# Patient Record
Sex: FEMALE | Race: WHITE | Employment: PART TIME | URBAN - METROPOLITAN AREA
[De-identification: names, ages, dates, MRNs, and addresses within clinical notes are randomized per-mention and may not be internally consistent; named-entity substitution may affect disease eponyms.]

---

## 2019-04-03 ENCOUNTER — APPOINTMENT (OUTPATIENT)
Dept: GENERAL RADIOLOGY | Age: 29
End: 2019-04-03
Payer: COMMERCIAL

## 2019-04-03 ENCOUNTER — HOSPITAL ENCOUNTER (EMERGENCY)
Age: 29
Discharge: HOME | End: 2019-04-03
Attending: EMERGENCY MEDICINE
Payer: COMMERCIAL

## 2019-04-03 VITALS
HEART RATE: 78 BPM | DIASTOLIC BLOOD PRESSURE: 83 MMHG | SYSTOLIC BLOOD PRESSURE: 148 MMHG | OXYGEN SATURATION: 100 % | BODY MASS INDEX: 39.49 KG/M2 | WEIGHT: 236.99 LBS | TEMPERATURE: 98 F | HEIGHT: 65 IN | RESPIRATION RATE: 18 BRPM

## 2019-04-03 DIAGNOSIS — S93.402A SPRAIN OF LEFT ANKLE, UNSPECIFIED LIGAMENT, INITIAL ENCOUNTER: Primary | ICD-10-CM

## 2019-04-03 PROCEDURE — 99283 EMERGENCY DEPT VISIT LOW MDM: CPT

## 2019-04-03 ASSESSMENT — PAIN DESCRIPTION - LOCATION
LOCATION: ANKLE
LOCATION: ANKLE

## 2019-04-03 ASSESSMENT — PAIN DESCRIPTION - PAIN TYPE
TYPE: ACUTE PAIN
TYPE: ACUTE PAIN

## 2019-04-03 ASSESSMENT — PAIN SCALES - GENERAL
PAINLEVEL_OUTOF10: 5
PAINLEVEL_OUTOF10: 6

## 2019-04-03 ASSESSMENT — PAIN DESCRIPTION - ORIENTATION
ORIENTATION: LEFT
ORIENTATION: LEFT

## 2019-04-03 ASSESSMENT — PAIN DESCRIPTION - DESCRIPTORS: DESCRIPTORS: ACHING

## 2019-04-03 NOTE — ED NOTES
Ambulates to room #2 accompanied by S.O. States that approx 2 weeks ago when in [place], stepped out of the car and got ankle caught between the car door and car-  States that she has slight bruising at that time. Upon coming home and walking on it and using the treadmill noticed increased  Swelling and it became painful to bear weight. Difficult to determine swelling due to obesity, no deformity, no bruising.   Pedal pulses of good volume     Lauren Huggins RN  04/03/19 7070

## 2019-04-03 NOTE — ED NOTES
4 inch ace wrap applied and reviewed instructions to wrap and rewrap several times a day, to elevate  And wear a support while on the treadmill     Ty Severs, RN  04/03/19 9239

## 2019-04-03 NOTE — ED PROVIDER NOTES
[address] [facility]  eMERGENCY dEPARTMENTeNCOUnter      Pt Name: Steven Dhaliwal  MRN: [id number]  [place] 1990  Date of evaluation: 2019  Provider: Samuel Luo MD    [address] [place] [address]       Chief Complaint   Patient presents with    Joint Swelling     injuried left ankle approx 2 weeks ago (caught between door and car) slight bruise at that time, but after returning home from vacation and walking on the treadmill notiticed increased swelling and pain with weight bearing         HISTORY OF PRESENT ILLNESS   (Location/Symptom, Timing/Onset,Context/Setting, Quality, Duration, Modifying Factors, Severity)  Note limiting factors. Steven Dhaliwal is a 33 y.o. female who presents to the emergency department for left ankle swelling. About 2 weeks ago she almost fell and she was getting out of the car and her foot got stuck. She is been doing well but recently a few days ago she started walking on a treadmill and noticed swelling. She has some pain which she describes as a discomfort with weightbearing otherwise she is been doing well. No other new injuries. Nursing notes were reviewed. REVIEW OF SYSTEMS    (2-9 systems for level 4, 10 or more for level 5)     Review of Systems    Positive and pertinent negative as per HPI. Except as noted above in the ROS, all other systems were reviewed and were negative. PAST MEDICAL HISTORY     Past Medical History:   Diagnosis Date    Gallstones     Multiple abscesses of both legs     Obese          SURGICALHISTORY       Past Surgical History:   Procedure Laterality Date     SECTION  3610    no complications    CHOLECYSTECTOMY  10/21/2011    laproscopic         CURRENT MEDICATIONS       Discharge Medication List as of 4/3/2019 12:41 AM          ALLERGIES     Penicillins and Tramadol    FAMILY HISTORY     History reviewed. No pertinent family history.        SOCIAL HISTORY       Social History Socioeconomic History    Marital status: Single     Spouse name: None    Number of children: None    Years of education: None    Highest education level: None   Occupational History    None   Social Needs    Financial resource strain: None    Food insecurity:     Worry: None     Inability: None    Transportation needs:     Medical: None     Non-medical: None   Tobacco Use    Smoking status: Current Every Day Smoker     Packs/day: 0.50    Smokeless tobacco: Never Used   Substance and Sexual Activity    Alcohol use: No    Drug use: No    Sexual activity: Yes     Partners: Male   Lifestyle    Physical activity:     Days per week: None     Minutes per session: None    Stress: None   Relationships    Social connections:     Talks on phone: None     Gets together: None     Attends Ep[employer] service: None     Active member of club or organization: None     Attends meetings of clubs or organizations: None     Relationship status: None    Intimate partner violence:     Fear of current or ex partner: None     Emotionally abused: None     Physically abused: None     Forced sexual activity: None   Other Topics Concern    None   Social History Narrative    None       SCREENINGS             PHYSICAL EXAM    (up to 7 for level 4, 8 or more for level 5)     ED Triage Vitals   BP Temp Temp Source Pulse Resp SpO2 Height Weight   04/03/19 0051 04/03/19 0013 04/03/19 0013 04/03/19 0013 04/03/19 0013 04/03/19 0013 04/03/19 0013 04/03/19 0013   (!) 148/83 98 °F (36.7 °C) Oral 98 18 100 % 5' 5\" (1.651 m) 236 lb 15.9 oz (107.5 kg)       Physical Exam   Constitutional: She appears well-developed and well-nourished. HENT:   Head: Normocephalic and atraumatic. Eyes: Right eye exhibits no discharge. Left eye exhibits no discharge. Pulmonary/Chest: Effort normal. No respiratory distress. Musculoskeletal:   Full range of motion of the ankles bilaterally. Mild medial ankle edema. On the left. No crepitus. Tenderness to palpation in the anterior ankle joint line. The ankles are stable. Normal capillary refill. No ecchymosis or lacerations or abrasions. Patient able to walk 4 steps on the ankle. Neurological: She is alert. Skin: No pallor. Psychiatric: She has a normal mood and affect. Her behavior is normal.   Nursing note and vitals reviewed. DIAGNOSTIC RESULTS     EKG: All EKG's are interpreted by the Emergency Department Physician who either signs or Co-signs this chart in the absence of a cardiologist.    12 lead EKG shows     RADIOLOGY:   Non-plain film images such as CT, Ultrasound and MRI are read by the radiologist. Plain radiographic images are visualized and preliminarily interpreted by the emergency physician with the below findings:    Interpretation per the Radiologist below, if available at the time of this note:    No orders to display         ED BEDSIDE ULTRASOUND:   Performed by ED Physician - none    LABS:  Labs Reviewed - No data to display    All other labs were within normal range or not returned as of this dictation. EMERGENCY DEPARTMENT COURSE and DIFFERENTIAL DIAGNOSIS/MDM:   Vitals:    Vitals:    04/03/19 0013 04/03/19 0051   BP:  (!) 148/83   Pulse: 98 78   Resp: 18    Temp: 98 °F (36.7 °C)    TempSrc: Oral    SpO2: 100%    Weight: 236 lb 15.9 oz (107.5 kg)    Height: 5' 5\" (1.651 m)        Likely ankle sprain. History and physical exam does not support a diagnosis of fracture or dislocation. Ace wrap provided. Follow-up in the outpatient setting as recommended. CRITICAL CARE TIME   None       CONSULTS:  None    PROCEDURES:  Unless otherwise noted above, none     Procedures    FINAL IMPRESSION      1. Sprain of left ankle, unspecified ligament, initial encounter          DISPOSITION/PLAN   DISPOSITION Decision To Discharge 04/03/2019 12:34:15 AM      PATIENT REFERREDTO:  Iveth Bedoya MD  [address] [place] [address].   [address] [place] [id number]  [phone]    In 1 week        DISCHARGEMEDICATIONS:  Discharge Medication List as of 4/3/2019 12:41 AM             (Please note that portions of this note were completed with a voice recognition program.  Efforts were made to edit the dictations but occasionally words are mis-transcribed.)    Darnell Dos Santos MD (electronically signed)  Attending Emergency Physician       Darnell Dos Santos MD  04/03/19 0931

## 2020-01-03 ENCOUNTER — HOSPITAL ENCOUNTER (EMERGENCY)
Age: 30
Discharge: HOME | End: 2020-01-03
Attending: EMERGENCY MEDICINE
Payer: COMMERCIAL

## 2020-01-03 VITALS
DIASTOLIC BLOOD PRESSURE: 84 MMHG | BODY MASS INDEX: 35.44 KG/M2 | TEMPERATURE: 98.7 F | WEIGHT: 212.96 LBS | OXYGEN SATURATION: 98 % | SYSTOLIC BLOOD PRESSURE: 132 MMHG | HEART RATE: 80 BPM | RESPIRATION RATE: 11 BRPM

## 2020-01-03 PROCEDURE — 99282 EMERGENCY DEPT VISIT SF MDM: CPT

## 2020-01-03 RX ORDER — CLINDAMYCIN HYDROCHLORIDE 300 MG/1
300 CAPSULE ORAL 3 TIMES DAILY
Qty: 21 CAPSULE | Refills: 0 | Status: SHIPPED | OUTPATIENT
Start: 2020-01-03 | End: 2020-01-10

## 2020-01-03 ASSESSMENT — PAIN DESCRIPTION - ORIENTATION: ORIENTATION: RIGHT

## 2020-01-03 ASSESSMENT — PAIN DESCRIPTION - LOCATION: LOCATION: TEETH

## 2020-01-03 ASSESSMENT — PAIN SCALES - GENERAL
PAINLEVEL_OUTOF10: 10
PAINLEVEL_OUTOF10: 2

## 2020-01-03 ASSESSMENT — PAIN - FUNCTIONAL ASSESSMENT: PAIN_FUNCTIONAL_ASSESSMENT: 0-10

## 2020-01-03 ASSESSMENT — PAIN DESCRIPTION - DESCRIPTORS: DESCRIPTORS: ACHING;THROBBING

## 2020-01-04 NOTE — ED PROVIDER NOTES
CHIEF COMPLAINT  Chief Complaint   Patient presents with    Dental Pain    Facial Swelling     right facial swelling. HISTORY OF PRESENT ILLNESS  Charisma Whitlock is a 26 y.o. female who presents to the ED complaining of a 2 day h/o pain of the right lower 3rd molar with pain in the right lateral mandible. No facial swelling, trismus, fever, dysphagia or neck pain. No HA. No other complaints, modifying factors or associated symptoms. Nursing notes reviewed. Past Medical History:   Diagnosis Date    Gallstones     Multiple abscesses of both legs     Obese      Past Surgical History:   Procedure Laterality Date     SECTION      no complications    CHOLECYSTECTOMY  10/21/2011    laproscopic     History reviewed. No pertinent family history.   Social History     Socioeconomic History    Marital status: Single     Spouse name: Not on file    Number of children: Not on file    Years of education: Not on file    Highest education level: Not on file   Occupational History    Not on file   Social Needs    Financial resource strain: Not on file    Food insecurity:     Worry: Not on file     Inability: Not on file    Transportation needs:     Medical: Not on file     Non-medical: Not on file   Tobacco Use    Smoking status: Current Every Day Smoker     Packs/day: 0.50     Types: Cigarettes    Smokeless tobacco: Never Used   Substance and Sexual Activity    Alcohol use: No    Drug use: No    Sexual activity: Yes     Partners: Male   Lifestyle    Physical activity:     Days per week: Not on file     Minutes per session: Not on file    Stress: Not on file   Relationships    Social connections:     Talks on phone: Not on file     Gets together: Not on file     Attends Jew service: Not on file     Active member of club or organization: Not on file     Attends meetings of clubs or organizations: Not on file     Relationship status: Not on file    Intimate partner violence:     Fear of current or ex partner: Not on file     Emotionally abused: Not on file     Physically abused: Not on file     Forced sexual activity: Not on file   Other Topics Concern    Not on file   Social History Narrative    Not on file     No current facility-administered medications for this encounter. Current Outpatient Medications   Medication Sig Dispense Refill    clindamycin (CLEOCIN) 300 MG capsule Take 1 capsule by mouth 3 times daily for 7 days 21 capsule 0     Allergies   Allergen Reactions    Penicillins     Tramadol        REVIEW OF SYSTEMS  Positives and pertinent negatives as per HPI. Six other systems were reviewed and are negative. Nursing notes pertaining to ROS were reviewed. PHYSICAL EXAM   /84   Pulse 80   Temp 98.7 °F (37.1 °C) (Oral)   Resp 11   Wt 212 lb 15.4 oz (96.6 kg)   LMP 12/01/2019 (Approximate)   SpO2 98%   BMI 35.44 kg/m²   GENERAL APPEARANCE: Awake and alert. Cooperative. No acute distress. HEAD: Normocephalic. Atraumatic. EYES: PERRL. EOM's grossly intact. No scleral icterus, injection or exudate  ENT: Mucous membranes are moist.  Erupting tooth #32 without sinus fistula or buccal edema. No trismus. TTP of tooth #32. No facial swelling or induration. No deep space induration or TTP  NECK: Supple. Normal ROM. No LAD  CHEST:  Regular rate and rhythm, no murmurs, rubs or gallops  LUNGS: Breathing is unlabored. Speaking comfortably in full sentences. Clear through auscultation bilaterally  EXTREMITIES: MAEE. No acute deformities. SKIN: Warm and dry. NEUROLOGICAL: Alert and oriented. ED COURSE/MDM  Toothache #32:  Possible erupting tooth versus periapical abscess. No evidence of deep space infection. Clindamycin TID x7d. Motrin/Tylenol PRN. Ambesol PRN. F/U with dentist for scheduled appointment in 3 days    Patient was given scripts for the following medications. I counseled patient how to take these medications.    New Prescriptions    CLINDAMYCIN (CLEOCIN) 300 MG CAPSULE    Take 1 capsule by mouth 3 times daily for 7 days         CLINICAL IMPRESSION  1. Pain, dental        Blood pressure 132/84, pulse 80, temperature 98.7 °F (37.1 °C), temperature source Oral, resp. rate 11, weight 212 lb 15.4 oz (96.6 kg), last menstrual period 12/01/2019, SpO2 98 %.       Follow-up with:  Dentist    In 3 days            Fallon Salcedo MD  01/03/20 7386

## 2020-09-15 ENCOUNTER — HOSPITAL ENCOUNTER (EMERGENCY)
Age: 30
Discharge: HOME | End: 2020-09-15
Payer: COMMERCIAL

## 2020-09-15 VITALS
OXYGEN SATURATION: 99 % | SYSTOLIC BLOOD PRESSURE: 142 MMHG | HEART RATE: 82 BPM | TEMPERATURE: 97.7 F | WEIGHT: 219.14 LBS | BODY MASS INDEX: 37.41 KG/M2 | RESPIRATION RATE: 16 BRPM | HEIGHT: 64 IN | DIASTOLIC BLOOD PRESSURE: 82 MMHG

## 2020-09-15 PROCEDURE — 99282 EMERGENCY DEPT VISIT SF MDM: CPT

## 2020-09-15 RX ORDER — IBUPROFEN 800 MG/1
800 TABLET ORAL EVERY 8 HOURS PRN
Qty: 20 TABLET | Refills: 0 | Status: SHIPPED | OUTPATIENT
Start: 2020-09-15 | End: 2022-10-25

## 2020-09-15 RX ORDER — CLINDAMYCIN HYDROCHLORIDE 150 MG/1
450 CAPSULE ORAL 3 TIMES DAILY
Qty: 90 CAPSULE | Refills: 0 | Status: SHIPPED | OUTPATIENT
Start: 2020-09-15 | End: 2020-09-25

## 2020-09-15 ASSESSMENT — PAIN DESCRIPTION - DESCRIPTORS: DESCRIPTORS: DISCOMFORT

## 2020-09-15 ASSESSMENT — PAIN SCALES - GENERAL
PAINLEVEL_OUTOF10: 3
PAINLEVEL_OUTOF10: 8
PAINLEVEL_OUTOF10: 5

## 2020-09-15 ASSESSMENT — PAIN DESCRIPTION - LOCATION: LOCATION: TEETH

## 2020-09-15 ASSESSMENT — PAIN DESCRIPTION - PAIN TYPE: TYPE: ACUTE PAIN

## 2020-09-15 NOTE — ED PROVIDER NOTES
[address]  [address] [address]  Dept: [phone]  Loc: [address] ENCOUNTER        This patient was not seen or evaluated by the attending physician. I evaluated this patient, the attending physician was available for consultation. CHIEF COMPLAINT    Chief Complaint   Patient presents with    Dental Pain       HPI    Stan Aldana is a 26 y.o. female who presents with dental pain localized in the bilateral left area of the mouth. The onset was several days ago. The duration has been constant since the onset. The quality of the pain is sharp. The pain worsens with chewing. States that she went to her typical dental office but it was closed permanently due to COVID-19 pandemic. She does not have another dental referral to follow-up with. Came to the ED for further evaluation and treatment. REVIEW OF SYSTEMS    Respiratory: No SOB or trismus  GI: No Vomiting  General: No measured Fever    PAST MEDICAL & SURGICAL HISTORY    Past Medical History:   Diagnosis Date    Gallstones     Multiple abscesses of both legs     Obese      Past Surgical History:   Procedure Laterality Date     SECTION  4647    no complications    CHOLECYSTECTOMY  10/21/2011    laproscopic       CURRENT MEDICATIONS  (may include discharge medications prescribed in the ED)  Current Outpatient Rx   Medication Sig Dispense Refill    clindamycin (CLEOCIN) 150 MG capsule Take 3 capsules by mouth 3 times daily for 10 days 90 capsule 0    ibuprofen (IBU) 800 MG tablet Take 1 tablet by mouth every 8 hours as needed for Pain 20 tablet 0    Magic Mouthwash (MIRACLE MOUTHWASH) Swish and spit 10 mLs 4 times daily as needed for Irritation or Dental Pain Dispense as Magic Mouthwash. Please add Equal Parts: 60 mL Maalox, 60 mL Viscous Lidocaine, 60 mL Benadryl to 60mL of Carafate.   10 ml swish and spit or swallow as directed above. 100 mL 0       ALLERGIES    Allergies   Allergen Reactions    Penicillins     Tramadol        SOCIAL & FAMILY HISTORY    Social History     Socioeconomic History    Marital status: Single     Spouse name: None    Number of children: None    Years of education: None    Highest education level: None   Occupational History    None   Social Needs    Financial resource strain: None    Food insecurity     Worry: None     Inability: None    Transportation needs     Medical: None     Non-medical: None   Tobacco Use    Smoking status: Current Every Day Smoker     Packs/day: 0.50     Types: Cigarettes    Smokeless tobacco: Never Used   Substance and Sexual Activity    Alcohol use: No    Drug use: No    Sexual activity: Yes     Partners: Male   Lifestyle    Physical activity     Days per week: None     Minutes per session: None    Stress: None   Relationships    Social connections     Talks on phone: None     Gets together: None     Attends Congregational service: None     Active member of club or organization: None     Attends meetings of clubs or organizations: None     Relationship status: None    Intimate partner violence     Fear of current or ex partner: None     Emotionally abused: None     Physically abused: None     Forced sexual activity: None   Other Topics Concern    None   Social History Narrative    None     History reviewed. No pertinent family history.     PHYSICAL EXAM    VITAL SIGNS: BP (!) 150/89   Pulse 87   Temp 97.3 °F (36.3 °C) (Oral)   Resp 16   Ht 5' 4\" (1.626 m)   Wt 219 lb 2.2 oz (99.4 kg)   LMP 09/13/2020   SpO2 99%   Breastfeeding No   BMI 37.61 kg/m²    Constitutional:  Well nourished, no acute distress   Oral: +dental decay of the multiple teeth, worse in the lower central incisors; with tenderness to percussion  Ears: external ears normal, the ipsilateral mastoid and pinna are without redness or swelling   Throat/Face:  no facial swelling, no facial dental clinics. I advised the patient to arrive at the clinic early in the morning to improve the chance of being seen. The patient verbalized understanding, is in agreement with the plan of discharge and has no further questions or concerns. FINAL IMPRESSION    1. Pain due to dental caries    2.  Pain, dental        PLAN  Oral analgesics, antibiotics, and follow up with a dentist as an outpatient    (Please note that this note was completed with a voice recognition program.  Every attempt was made to edit the dictations, but inevitably there remain words that are mis-transcribed.)        MERYL Garcia - YOVANY  09/15/20 7703

## 2020-09-15 NOTE — ED NOTES
Discharge and education instructions reviewed. Patient verbalized understanding, teach-back successful. Patient denied questions at this time. No acute distress noted. Patient instructed to follow-up as noted - return to emergency department if symptoms worsen. Patient verbalized understanding. Discharged per EDMD with discharge instructions.         Emelyn Sanchez RN  09/15/20 6989

## 2021-02-21 ENCOUNTER — HOSPITAL ENCOUNTER (EMERGENCY)
Age: 31
Discharge: HOME | End: 2021-02-21
Payer: COMMERCIAL

## 2021-02-21 VITALS
RESPIRATION RATE: 12 BRPM | OXYGEN SATURATION: 98 % | HEART RATE: 80 BPM | WEIGHT: 220.9 LBS | TEMPERATURE: 97.4 F | SYSTOLIC BLOOD PRESSURE: 150 MMHG | BODY MASS INDEX: 37.92 KG/M2 | DIASTOLIC BLOOD PRESSURE: 76 MMHG

## 2021-02-21 DIAGNOSIS — M54.16 LUMBAR RADICULOPATHY: Primary | ICD-10-CM

## 2021-02-21 PROCEDURE — 96372 THER/PROPH/DIAG INJ SC/IM: CPT

## 2021-02-21 PROCEDURE — 99283 EMERGENCY DEPT VISIT LOW MDM: CPT

## 2021-02-21 PROCEDURE — 6360000002 HC RX W HCPCS: Performed by: PHYSICIAN ASSISTANT

## 2021-02-21 RX ORDER — KETOROLAC TROMETHAMINE 30 MG/ML
30 INJECTION, SOLUTION INTRAMUSCULAR; INTRAVENOUS ONCE
Status: COMPLETED | OUTPATIENT
Start: 2021-02-21 | End: 2021-02-21

## 2021-02-21 RX ORDER — CYCLOBENZAPRINE HCL 10 MG
10 TABLET ORAL 3 TIMES DAILY PRN
Qty: 20 TABLET | Refills: 0 | Status: SHIPPED | OUTPATIENT
Start: 2021-02-21 | End: 2021-03-03

## 2021-02-21 RX ORDER — KETOROLAC TROMETHAMINE 10 MG/1
10 TABLET, FILM COATED ORAL 3 TIMES DAILY
Qty: 15 TABLET | Refills: 0 | Status: SHIPPED | OUTPATIENT
Start: 2021-02-21 | End: 2022-10-25

## 2021-02-21 RX ADMIN — KETOROLAC TROMETHAMINE 30 MG: 30 INJECTION, SOLUTION INTRAMUSCULAR at 13:46

## 2021-02-21 ASSESSMENT — PAIN DESCRIPTION - ORIENTATION
ORIENTATION: RIGHT
ORIENTATION: RIGHT

## 2021-02-21 ASSESSMENT — PAIN DESCRIPTION - LOCATION: LOCATION: LEG

## 2021-02-21 ASSESSMENT — PAIN DESCRIPTION - PAIN TYPE: TYPE: ACUTE PAIN

## 2021-02-21 ASSESSMENT — PAIN SCALES - GENERAL: PAINLEVEL_OUTOF10: 5

## 2021-02-21 ASSESSMENT — ENCOUNTER SYMPTOMS
BACK PAIN: 1
NAUSEA: 0

## 2021-02-21 ASSESSMENT — PAIN DESCRIPTION - FREQUENCY: FREQUENCY: CONTINUOUS

## 2021-02-21 ASSESSMENT — PAIN - FUNCTIONAL ASSESSMENT: PAIN_FUNCTIONAL_ASSESSMENT: 0-10

## 2021-02-21 NOTE — ED PROVIDER NOTES
[address] [facility]      Pt Name: Julia Lockhart  MRN: [id number]  [place] 1990  Date of evaluation: 2/21/2021  Provider: Sandra Washington PA-C    This patient was not seen and evaluated by the attending physician No att. providers found. CHIEF COMPLAINT       Chief Complaint   Patient presents with    Leg Pain     pain in right leg for one month         HISTORYOF PRESENT ILLNESS  (Location/Symptom, Timing/Onset, Context/Setting, Quality, Duration, Modifying Factors, Severity.)   Julia Lockhart is a 31 y.o. female who presents to the emergency department complaining of right leg pain. The patient states about 4 to 6 weeks ago she developed pain in her right low back that has since migrated to the right leg. She states pain radiates down the back of her thigh. Nothing makes it better and she has tried Tylenol, warm showers and heat pads without significant relief. Seems to worsen with laying down. She tolerates ambulating. She reports no associated numbness, weakness of extremities, loss of control of bowel or bladder, saddle anesthesia or other acute accompanying symptoms. No history of injury. Nursing Notes were reviewedand I agree. REVIEW OF SYSTEMS    (2-9 systems for level 4, 10 or more forlevel 5)     Review of Systems   Constitutional: Negative for chills and fever. Gastrointestinal: Negative for nausea. Genitourinary: Negative for difficulty urinating. Musculoskeletal: Positive for back pain and myalgias. Negative for arthralgias and neck pain. Skin: Negative for rash and wound. Neurological: Negative for weakness and numbness. All other systems reviewed and are negative. Except as noted above the remainder ofthe review of systems was reviewed and negative.        PAST MEDICALHISTORY         Diagnosis Date    Gallstones     Multiple abscesses of both legs     Obese        SURGICAL HISTORY Procedure Laterality Date     SECTION  6881    no complications    CHOLECYSTECTOMY  10/21/2011    laproscopic       CURRENT MEDICATIONS       Previous Medications    IBUPROFEN (IBU) 800 MG TABLET    Take 1 tablet by mouth every 8 hours as needed for Pain    MAGIC MOUTHWASH (MIRACLE MOUTHWASH)    Swish and spit 10 mLs 4 times daily as needed for Irritation or Dental Pain Dispense as Magic Mouthwash. Please add Equal Parts: 60 mL Maalox, 60 mL Viscous Lidocaine, 60 mL Benadryl to 60mL of Carafate. 10 ml swish and spit or swallow as directed above. ALLERGIES     Penicillins and Tramadol    FAMILY HISTORY     History reviewed. No pertinent family history. No family status information on file. SOCIAL HISTORY    reports that she has been smoking cigarettes. She has been smoking about 0.50 packs per day. She has never used smokeless tobacco. She reports that she does not drink alcohol or use drugs. PHYSICAL EXAM    (up to 7 for level 4, 8 or more for level 5)     ED Triage Vitals [21 1252]   BP Temp Temp Source Pulse Resp SpO2 Height Weight   (!) 145/85 97.4 °F (36.3 °C) Oral 89 20 100 % -- 220 lb 14.4 oz (100.2 kg)       Physical Exam  Vitals signs and nursing note reviewed. Constitutional:       General: She is not in acute distress. Appearance: She is well-developed. HENT:      Head: Normocephalic and atraumatic. Neck:      Musculoskeletal: Neck supple. Pulmonary:      Effort: Pulmonary effort is normal. No respiratory distress. Musculoskeletal: Normal range of motion. General: No swelling or tenderness. Skin:     General: Skin is warm and dry. Neurological:      General: No focal deficit present. Mental Status: She is alert and oriented to person, place, and time. Sensory: No sensory deficit. Motor: No weakness.       Comments: +straight leg raise   Psychiatric:         Behavior: Behavior normal.            EMERGENCY DEPARTMENT COURSE and DIFFERENTIAL DIAGNOSIS/MDM:   Vitals:    Vitals:    02/21/21 1252   BP: (!) 145/85   Pulse: 89   Resp: 20   Temp: 97.4 °F (36.3 °C)   TempSrc: Oral   SpO2: 100%   Weight: 220 lb 14.4 oz (100.2 kg)        I have evaluated this patient. My supervising physician was available for consultation. Patient is neurovascularly intact without acute red flag signs or symptoms concerning for acute neurosurgical emergency. I have recommended scheduled NSAIDs and close PCP follow-up. I suspect sciatica however instructed her if her symptoms do not resolve she may need further outpatient evaluation with MRI and/or referral to physical therapy for further management. Patient is in agreement with plan. She was given IM Toradol here and discharged on a scheduled Toradol course for 5 days. She was also given Flexeril to use at night as needed. Discussed results, diagnosis and plan with patient and/or family. Questions addressed. Dispositionand follow-up agreed upon. Specific discharge instructions explained. The patient and/or family and I have discussed the diagnosis and risks, and we agree with discharging home to follow-up with their primary care,specialist or referral doctor. We also discussed returning to the Emergency Department immediately if new or worsening symptoms occur. We have discussed the symptoms which are most concerning that necessitate immediatereturn. PROCEDURES:  None    FINAL IMPRESSION      1.  Lumbar radiculopathy          DISPOSITION/PLAN   DISPOSITION Discharge - Pending Orders Complete 02/21/2021 01:11:42 PM      PATIENT REFERRED TO:  [facility]) Pre-Services  [phone]  Schedule an appointment as soon as possible for a visit       [facility] Emergency Department  [address] [facility]  [phone]    If symptoms worsen      MEDICATIONS:  New Prescriptions    CYCLOBENZAPRINE (FLEXERIL) 10 MG TABLET    Take 1 tablet by mouth 3 times daily as needed for Muscle spasms    KETOROLAC (TORADOL) 10 MG TABLET    Take 1 tablet by mouth three times daily for 5 days       (Please note that portions of this note were completed with a voice recognition program.  Efforts were made toedit the dictations but occasionally words are mis-transcribed.)    MAXI Moreland PA-C  02/21/21 8677

## 2021-02-21 NOTE — ED NOTES
D/C: Order noted for d/c. Pt confirmed d/c paperwork and two prescriptions have correct name. Discharge and education instructions reviewed with patient. Teach-back successful. Pt verbalized understanding and signed d/c papers. Pt denied questions at this time. No acute distress noted. Patient instructed to follow-up as noted - return to emergency department if symptoms worsen. Patient verbalized understanding. Discharged per EDMD with discharge instructions. Pt discharged to private vehicle. Patient stable upon departure. Thanked patient for choosing [facility] for care. Provider aware of patient pain at time of discharge.        Karon Quinn, RN  02/21/21 0783

## 2021-02-21 NOTE — ED TRIAGE NOTES
Patient admitted to ED via private car with complaints of right leg pain for the last month. Patient states that it's worse when she lays down at night. Denies injury or previously having this pain. History of gallstones, abscesses on legs, and obesity. BP is elevated. Other VS are WNL. Patient is alert and oriented x4.

## 2022-10-25 ENCOUNTER — HOSPITAL ENCOUNTER (EMERGENCY)
Age: 32
Discharge: HOME | End: 2022-10-25
Attending: EMERGENCY MEDICINE
Payer: COMMERCIAL

## 2022-10-25 VITALS
DIASTOLIC BLOOD PRESSURE: 99 MMHG | OXYGEN SATURATION: 97 % | HEART RATE: 79 BPM | SYSTOLIC BLOOD PRESSURE: 148 MMHG | HEIGHT: 65 IN | BODY MASS INDEX: 41.76 KG/M2 | TEMPERATURE: 98.9 F | RESPIRATION RATE: 20 BRPM | WEIGHT: 250.66 LBS

## 2022-10-25 DIAGNOSIS — U07.1 COVID: Primary | ICD-10-CM

## 2022-10-25 LAB
S PYO AG THROAT QL: NEGATIVE
SARS-COV-2, NAAT: DETECTED

## 2022-10-25 PROCEDURE — 87081 CULTURE SCREEN ONLY: CPT

## 2022-10-25 PROCEDURE — 87077 CULTURE AEROBIC IDENTIFY: CPT

## 2022-10-25 PROCEDURE — 87880 STREP A ASSAY W/OPTIC: CPT

## 2022-10-25 PROCEDURE — 99283 EMERGENCY DEPT VISIT LOW MDM: CPT

## 2022-10-25 PROCEDURE — 87635 SARS-COV-2 COVID-19 AMP PRB: CPT

## 2022-10-25 NOTE — ED NOTES
D/C: Order noted for d/c. Pt confirmed d/c paperwork have correct name. Discharge and education instructions reviewed with patient. Teach-back successful. Pt verbalized understanding and signed d/c papers. Pt denied questions at this time. No acute distress noted. Patient instructed to follow-up as noted - return to emergency department if symptoms worsen. Patient verbalized understanding. Discharged per EDMD with discharge instructions. Pt discharged and ambulated to private vehicle. Patient stable upon departure. Thanked patient for choosing [facility] for care.         John Jonas RN  10/25/22 5848

## 2022-10-25 NOTE — ED PROVIDER NOTES
eMERGENCY dEPARTMENT eNCOUnter      Pt Name: Jaleel Mercado  MRN: [id number]  [place] 1990  Date of evaluation: 10/25/2022  Provider: Latia Vicente MD     [address] [place] [address]       Chief Complaint   Patient presents with    Concern For COVID-19     Sore throat, chills, fatigue x 3 days          HISTORY OF PRESENT ILLNESS   (Location/Symptom, Timing/Onset,Context/Setting, Quality, Duration, Modifying Factors, Severity) Note limiting factors. HPI    Jaleel Mercado is a 27 y.o. female who presents to the emergency department with exposure to [place]. Patient states she had a roommate who tested positive for COVID. Now patient has 2 to 3 days of fever chills achiness. Patient had [place] already couple years ago. Patient denies being vaccinated. Patient has symptoms of COVID. There has been a nonproductive cough. No fever at this time. Nursing Notes were reviewed. REVIEW OFSYSTEMS    (2+ for level 4; 10+ for level 5)   Review of Systems    General: No fevers, chills or night sweats, No weight loss    Head: Positive sore throat,  No Ear Pain    Chest:  Nontender. Positive cough, No SOB,  Chest Pain    GI: No abdominal pain or vomiting    : No dysuria or hematuria    Musculoskeletal: No unrelenting pain or night pain    Neurologic: No bowel or bladder incontinence, No saddle anesthesia, No leg weakness    All other systems reviewed and are negative. PAST MEDICAL HISTORY     Past Medical History:   Diagnosis Date    Gallstones     Multiple abscesses of both legs     Obese        SURGICAL HISTORY       Past Surgical History:   Procedure Laterality Date     SECTION  4801    no complications    CHOLECYSTECTOMY  10/21/2011    laproscopic       CURRENT MEDICATIONS       Previous Medications    No medications on file       ALLERGIES     Penicillins and Tramadol    FAMILY HISTORY     History reviewed. No pertinent family history.      SOCIAL HISTORY       Social History Socioeconomic History    Marital status: Single     Spouse name: None    Number of children: None    Years of education: None    Highest education level: None   Tobacco Use    Smoking status: Every Day     Packs/day: 0.50     Types: Cigarettes    Smokeless tobacco: Never   Substance and Sexual Activity    Alcohol use: No    Drug use: No    Sexual activity: Yes     Partners: Male       SCREENINGS    Jorge Coma Scale  Eye Opening: Spontaneous  Best Verbal Response: Oriented  Best Motor Response: Obeys commands  Jorge Coma Scale Score: 15      PHYSICAL EXAM    (up to 7 for level 4, 8 or more for level 5)     ED Triage Vitals [10/25/22 1134]   BP Temp Temp Source Heart Rate Resp SpO2 Height Weight   (!) 148/99 98.9 °F (37.2 °C) Oral 79 20 97 % 5' 5\" (1.651 m) 250 lb 10.6 oz (113.7 kg)       Physical Exam    General: Alert and awake ×3. Nontoxic appearance. Well-developed well-nourished 57-year-old no acute distress  HEENT: Normocephalic atraumatic. Neck is supple. Airway intact. No adenopathy. Mild erythema of the pharynx. Cardiac: Regular rate and rhythm with no murmurs rubs or gallops  Pulmonary: Lungs are clear in all lung fields. No wheezing. No Rales. Abdomen: Soft and nontender. Negative hepatosplenomegaly. Bowel sounds are active  Extremities: Moving all extremities. No calf tenderness. Peripheral pulses all intact  Skin: No skin lesions. No rashes  Neurologic: Cranial nerves II through XII was grossly intact. Nonfocal neurological exam  Psychiatric: Patient is pleasant. Mood is appropriate. DIAGNOSTIC RESULTS     EKG (Per Emergency Physician):       RADIOLOGY (Per Emergency Physician): Interpretation per the Radiologist below, if available at the time of this note:  No results found.     ED BEDSIDE ULTRASOUND:   Performed by ED Physician - none    LABS:  Labs Reviewed   COVID-19, RAPID - Abnormal; Notable for the following components:       Result Value    SARS-CoV-2, NAAT DETECTED (*)     All other components within normal limits   STREP SCREEN GROUP A THROAT   CULTURE, BETA STREP CONFIRM PLATES        All other labs were within normal range or not returned as of this dictation. Procedures      EMERGENCY DEPARTMENT COURSE and DIFFERENTIAL DIAGNOSIS/MDM:   Vitals:    Vitals:    10/25/22 1134   BP: (!) 148/99   Pulse: 79   Resp: 20   Temp: 98.9 °F (37.2 °C)   TempSrc: Oral   SpO2: 97%   Weight: 250 lb 10.6 oz (113.7 kg)   Height: 5' 5\" (1.651 m)       Medications - No data to display    MDM  . Patient is a 69-year-old with exposure to COVID from her roommate. Patient now have symptoms of COVID. Patient was tested and is positive. Patient is hemodynamically stable. Pulse ox 97% no need for admission. Patient will be a discharge on conservative treatment COVID instructions and isolation given patient discharged in good condition    REVAL:         CRITICAL CARE TIME   Total CriticalCare time was 0 minutes, excluding separately reportable procedures. There was a high probability of clinically significant/life threatening deterioration in the patient's condition which required my urgent intervention. CONSULTS:  None    PROCEDURES:  Unless otherwise noted below, none     [unfilled]    FINAL IMPRESSION      1. COVID          DISPOSITION/PLAN   DISPOSITION Decision To Discharge 10/25/2022 01:15:06 PM      PATIENT REFERRED TO:  Family physician    Schedule an appointment as soon as possible for a visit in 1 week  If symptoms worsen      DISCHARGE MEDICATIONS:  New Prescriptions    No medications on file          (Please note:  Portions of this note were completed with a voice recognition program.Efforts were made to edit the dictations but occasionally words and phrases are mis-transcribed.)  Form v2016. J.[id number]    BANG PARK MD (electronically signed)  Emergency Medicine Provider       Fredrik Aase, MD  10/25/22 7060

## 2022-10-25 NOTE — ED TRIAGE NOTES
Patient arrived to ED via private vehicle. Patient reports her roommate tested positive for covid yesterday. Patient has had a sore throat, chills, and fatigue x 3 days.

## 2022-10-27 LAB
ORGANISM: ABNORMAL
S PYO THROAT QL CULT: ABNORMAL
S PYO THROAT QL CULT: ABNORMAL

## 2022-10-28 NOTE — RESULT ENCOUNTER NOTE
Patient's positive result has been appropriately evaluated by the provider pool. Patient was unable to be reached over the phone. A voicemail was left with the patient. Will await a return phone call. Will try to reach patient again tomorrow per protocol. Spoke with Dr. Richelle Edwards regarding pt allergy to PCN. If severe allergy, change to Clindamycin 450 mg TID x 5 days.

## 2022-10-28 NOTE — RESULT ENCOUNTER NOTE
Culture reviewed, positive for Group G. Please call and prescribe penicillin V 500mg BID x 5 days. Per UpToDate: Treatment of pharyngitis when GCS or GGS has been isolated from a throat culture may be indicated in the setting of a point-source (eg, foodborne) outbreak or in sporadic cases in which clinical features suggest pyogenic bacterial infection (ie, fever, tonsillar exudates, tender cervical lymphadenopathy, and absence of cough or rhinorrhea) [66]. The same regimens recommended for S. pyogenes are suitable for GCS/GGS pharyngitis. However, GCS/GGS pharyngitis is not known to trigger acute rheumatic fever; therefore, a treatment course of 5 days (rather than 10 days) is reasonable.

## 2024-01-27 ENCOUNTER — HOSPITAL ENCOUNTER (EMERGENCY)
Age: 34
Discharge: HOME | End: 2024-01-27
Attending: STUDENT IN AN ORGANIZED HEALTH CARE EDUCATION/TRAINING PROGRAM
Payer: COMMERCIAL

## 2024-01-27 VITALS
HEART RATE: 85 BPM | OXYGEN SATURATION: 98 % | HEIGHT: 65 IN | DIASTOLIC BLOOD PRESSURE: 106 MMHG | TEMPERATURE: 98.8 F | WEIGHT: 254.19 LBS | RESPIRATION RATE: 17 BRPM | BODY MASS INDEX: 42.35 KG/M2 | SYSTOLIC BLOOD PRESSURE: 156 MMHG

## 2024-01-27 DIAGNOSIS — M54.31 SCIATICA OF RIGHT SIDE: Primary | ICD-10-CM

## 2024-01-27 PROCEDURE — 96372 THER/PROPH/DIAG INJ SC/IM: CPT

## 2024-01-27 PROCEDURE — 99284 EMERGENCY DEPT VISIT MOD MDM: CPT

## 2024-01-27 PROCEDURE — 6360000002 HC RX W HCPCS: Performed by: STUDENT IN AN ORGANIZED HEALTH CARE EDUCATION/TRAINING PROGRAM

## 2024-01-27 PROCEDURE — 6370000000 HC RX 637 (ALT 250 FOR IP): Performed by: STUDENT IN AN ORGANIZED HEALTH CARE EDUCATION/TRAINING PROGRAM

## 2024-01-27 RX ORDER — LIDOCAINE 4 G/G
1 PATCH TOPICAL DAILY
Qty: 30 PATCH | Refills: 0 | Status: SHIPPED | OUTPATIENT
Start: 2024-01-27 | End: 2024-02-26

## 2024-01-27 RX ORDER — METHYLPREDNISOLONE 4 MG/1
TABLET ORAL
Qty: 1 KIT | Refills: 0 | Status: SHIPPED | OUTPATIENT
Start: 2024-01-27 | End: 2024-02-02

## 2024-01-27 RX ORDER — CYCLOBENZAPRINE HCL 10 MG
10 TABLET ORAL 3 TIMES DAILY PRN
Qty: 30 TABLET | Refills: 0 | Status: SHIPPED | OUTPATIENT
Start: 2024-01-27 | End: 2024-02-06

## 2024-01-27 RX ORDER — IBUPROFEN 600 MG/1
600 TABLET ORAL EVERY 6 HOURS PRN
Qty: 120 TABLET | Refills: 0 | Status: SHIPPED | OUTPATIENT
Start: 2024-01-27

## 2024-01-27 RX ORDER — ACETAMINOPHEN 325 MG/1
650 TABLET ORAL ONCE
Status: COMPLETED | OUTPATIENT
Start: 2024-01-27 | End: 2024-01-27

## 2024-01-27 RX ORDER — LIDOCAINE 4 G/G
1 PATCH TOPICAL ONCE
Status: DISCONTINUED | OUTPATIENT
Start: 2024-01-27 | End: 2024-01-27 | Stop reason: HOSPADM

## 2024-01-27 RX ORDER — ORPHENADRINE CITRATE 30 MG/ML
60 INJECTION INTRAMUSCULAR; INTRAVENOUS ONCE
Status: COMPLETED | OUTPATIENT
Start: 2024-01-27 | End: 2024-01-27

## 2024-01-27 RX ORDER — KETOROLAC TROMETHAMINE 30 MG/ML
30 INJECTION, SOLUTION INTRAMUSCULAR; INTRAVENOUS ONCE
Status: COMPLETED | OUTPATIENT
Start: 2024-01-27 | End: 2024-01-27

## 2024-01-27 RX ADMIN — ACETAMINOPHEN 650 MG: 325 TABLET ORAL at 21:23

## 2024-01-27 RX ADMIN — KETOROLAC TROMETHAMINE 30 MG: 30 INJECTION INTRAMUSCULAR; INTRAVENOUS at 21:22

## 2024-01-27 RX ADMIN — ORPHENADRINE CITRATE 60 MG: 60 INJECTION INTRAMUSCULAR; INTRAVENOUS at 21:23

## 2024-01-28 NOTE — ED PROVIDER NOTES
[facility]      EMERGENCY MEDICINE     Pt Name: Debra Falcon  MRN: [id number]  Birthdate 1990  Date of evaluation: 2024  Provider: Pranav Lin MD    CHIEF COMPLAINT       Chief Complaint   Patient presents with    Back Pain    Leg Pain     HISTORY OF PRESENT ILLNESS   Debra Falcon is a 33 y.o. 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  No recent traumatic injuries or falls.  No numbness or tingling no genitalia numbness or tingling no fecal or urine incontinence.  No history of back surgeries no fevers or chills.     PASTMEDICAL HISTORY     Past Medical History:   Diagnosis Date    Gallstones     Multiple abscesses of both legs     Obese        Patient Active Problem List   Diagnosis Code    Symptomatic cholelithiasis K80.20     SURGICAL HISTORY       Past Surgical History:   Procedure Laterality Date     SECTION      no complications    CHOLECYSTECTOMY  10/21/2011    laproscopic       CURRENT MEDICATIONS       Previous Medications    No medications on file       ALLERGIES     is allergic to penicillins and tramadol.    FAMILY HISTORY     has no family status information on file.        SOCIAL HISTORY       Social History     Tobacco Use    Smoking status: Every Day     Current packs/day: 0.50     Types: Cigarettes    Smokeless tobacco: Never   Substance Use Topics    Alcohol use: No    Drug use: No       PHYSICAL EXAM       ED Triage Vitals   BP Temp Temp src Pulse Resp SpO2 Height Weight   -- -- -- -- -- -- -- --     Vitals:    24   BP: (!) 156/106   Pulse: 85   Resp: 17   Temp: 98.8 °F (37.1 °C)   SpO2: 98%       Physical Exam  Vitals and nursing note reviewed.   Constitutional:       General: She is not in acute distress.     Appearance: She is obese. She is not

## 2024-01-28 NOTE — ED TRIAGE NOTES
Pt to ER with c/o low back pain with radiation from right buttock to thigh x1 month.    Pt denies trauma or fall but ednorses that she works from home and is seated for 8h during her shift.    GCS 15  VSS

## 2024-01-28 NOTE — PROGRESS NOTES
DC, RX and follow-up instructions given. Pt verbalized understanding. DC home in stable condition.

## 2024-01-28 NOTE — DISCHARGE INSTRUCTIONS
Take your medications as prescribed.  You may also take Tylenol in addition to the medications prescribed to you.  Do not drive or operate heavy machinery while taking Flexeril as it can make you sleepy.      Use a heating pad as we discussed as well as using a cushion to help with your pain.      Try to walk frequently throughout the day is much as possible.      Return if you develop any new or worsening symptoms